# Patient Record
Sex: MALE | Race: BLACK OR AFRICAN AMERICAN | Employment: FULL TIME | URBAN - METROPOLITAN AREA
[De-identification: names, ages, dates, MRNs, and addresses within clinical notes are randomized per-mention and may not be internally consistent; named-entity substitution may affect disease eponyms.]

---

## 2017-08-30 ENCOUNTER — HOSPITAL ENCOUNTER (EMERGENCY)
Age: 32
Discharge: HOME | End: 2017-08-31
Attending: EMERGENCY MEDICINE
Payer: COMMERCIAL

## 2017-08-30 VITALS
WEIGHT: 126 LBS | BODY MASS INDEX: 19.16 KG/M2 | SYSTOLIC BLOOD PRESSURE: 123 MMHG | TEMPERATURE: 98.5 F | RESPIRATION RATE: 18 BRPM | HEART RATE: 79 BPM | OXYGEN SATURATION: 100 % | DIASTOLIC BLOOD PRESSURE: 80 MMHG

## 2017-08-30 DIAGNOSIS — Z20.2 POSSIBLE EXPOSURE TO STD: Primary | ICD-10-CM

## 2017-08-30 PROCEDURE — 99282 EMERGENCY DEPT VISIT SF MDM: CPT

## 2017-08-31 LAB
HAV IGM SER QL: NEGATIVE
HBV CORE IGM SER QL: NEGATIVE
HBV SURFACE AG SER QL: <0.1 INDEX
HBV SURFACE AG SER QL: NEGATIVE
HCV AB SER IA-ACNC: 0.1 INDEX
HCV AB SERPL QL IA: NEGATIVE
HCV COMMENT,HCGAC: NORMAL
RPR SER QL: NONREACTIVE
SP1: NORMAL
SP2: NORMAL
SP3: NORMAL

## 2017-08-31 PROCEDURE — 86592 SYPHILIS TEST NON-TREP QUAL: CPT | Performed by: PHYSICIAN ASSISTANT

## 2017-08-31 PROCEDURE — 86694 HERPES SIMPLEX NES ANTBDY: CPT | Performed by: PHYSICIAN ASSISTANT

## 2017-08-31 PROCEDURE — 80074 ACUTE HEPATITIS PANEL: CPT | Performed by: PHYSICIAN ASSISTANT

## 2017-08-31 NOTE — ED PROVIDER NOTES
HPI Comments: Analilia Nelson is a 33 y.o. male with a pertinent history of anemia, asthma who presents to the emergency department for evaluation of intermittent painful tongue lesions x 8 months. He relates he does not have them currently, but he just wanted to get checked. He admits to recent protected anal intercourse. He states he had GC/CT swab and HIV test performed at [facility] recently and states that both tests were negative. Pt denies any fevers or chills, headache, dizziness or light headedness, ENT issues, CP or discomfort, SOB, cough, n/v/d/c, abd pain, back pain,melena/hematochezia, dysuria, hematuria, frequency, focal weakness/numbness/tingling, genital lesions, anal lesions, or rash. Patient has no other complaints at this time. PCP:  None        Patient is a 33 y.o. male presenting with sexually transmitted disease. Sexually Transmitted Disease   Pertinent negatives include no chest pain, no abdominal pain, no headaches and no shortness of breath. Past Medical History:   Diagnosis Date    Anemia     Asthma     History of blood transfusion        History reviewed. No pertinent surgical history. History reviewed. No pertinent family history. Social History     Social History    Marital status: SINGLE     Spouse name: N/A    Number of children: N/A    Years of education: N/A     Occupational History    Not on file. Social History Main Topics    Smoking status: Never Smoker    Smokeless tobacco: Not on file    Alcohol use Yes      Comment: social    Drug use: No    Sexual activity: Not on file     Other Topics Concern    Not on file     Social History Narrative         ALLERGIES: Review of patient's allergies indicates no known allergies. Review of Systems   Constitutional: Negative for chills and fever. HENT: Positive for mouth sores. Negative for congestion, rhinorrhea and sore throat. Respiratory: Negative for cough and shortness of breath. Cardiovascular: Negative for chest pain. Gastrointestinal: Negative for abdominal pain, blood in stool, constipation, diarrhea, nausea and vomiting. Genitourinary: Negative for discharge, dysuria, frequency, genital sores, hematuria, penile pain and testicular pain. Musculoskeletal: Negative for back pain and myalgias. Skin: Negative for rash and wound. Neurological: Negative for dizziness and headaches. Vitals:    08/30/17 2242   BP: 123/80   Pulse: 79   Resp: 18   Temp: 98.5 °F (36.9 °C)   SpO2: 100%   Weight: 57.2 kg (126 lb)            Physical Exam   Constitutional: He is oriented to person, place, and time. He appears well-developed and well-nourished. No distress. HENT:   Head: Normocephalic and atraumatic. Unremarkable internal oral exam.  No defined lesions. Eyes: Conjunctivae and EOM are normal. Right eye exhibits no discharge. Left eye exhibits no discharge. Neck: Normal range of motion. Neck supple. No thyromegaly present. Cardiovascular: Normal rate, regular rhythm and normal heart sounds. Exam reveals no gallop and no friction rub. No murmur heard. Pulmonary/Chest: Effort normal and breath sounds normal. No respiratory distress. He has no wheezes. He has no rales. He exhibits no tenderness. Abdominal: Soft. Bowel sounds are normal. He exhibits no distension. There is no tenderness. There is no rebound and no guarding. Musculoskeletal: Normal range of motion. He exhibits no edema or deformity. Lymphadenopathy:     He has no cervical adenopathy. Neurological: He is alert and oriented to person, place, and time. Skin: Skin is warm and dry. He is not diaphoretic. Psychiatric: He has a normal mood and affect. Nursing note and vitals reviewed.        MDM  Number of Diagnoses or Management Options  Possible exposure to STD: new and requires workup  Diagnosis management comments: Differential Diagnosis:  Trichomonas, chlamydia, gonorrhea, HSV, HPV, syphilis, yeast, contact dermatitis, molluscum contagiosum, prostatitis, epididymitis, UTI, HIV, Hepatitis    Plan:  Pt presents in NAD, vitals wnl. He has no current symptoms. He just wants to be checked for STDs. RPR, hepatitis, and HSV tests ordered and pending. At this time, patient is stable and appropriate for discharge home. Patient demonstrates understanding of current diagnoses and is in agreement with the treatment plan. They are advised that while the likelihood of serious underlying condition is low at this point given the evaluation performed today, we cannot fully rule it out. They are advised to immediately return with any new symptoms or worsening of current condition. All questions have been answered. Patient is given educational material regarding their diagnoses, including danger symptoms and when to return to the ED. Follow-up with STD clinic. Amount and/or Complexity of Data Reviewed  Clinical lab tests: ordered and reviewed  Review and summarize past medical records: yes    Risk of Complications, Morbidity, and/or Mortality  Presenting problems: moderate  Diagnostic procedures: moderate  Management options: moderate    Patient Progress  Patient progress: improved    ED Course       Procedures      -------------------------------------------------------------------------------------------------------------------  Orders:  Orders Placed This Encounter    HSV 1/2 AB, IGG/IGM     Standing Status:   Standing     Number of Occurrences:   1    RPR     Standing Status:   Standing     Number of Occurrences:   1    HEPATITIS PANEL, ACUTE     Standing Status:   Standing     Number of Occurrences:   1        Lab Results:   No results found for this or any previous visit (from the past 12 hour(s)).     Progress Notes:  12:45 AM:  Nash Swan PA-C was at the pt's bedside, assessed the pt and answered the pt's questions regarding treatment.    -------------------------------------------------------------------------------------------------------------------    Disposition:  Diagnosis:   1. Possible exposure to STD        Disposition: [place] Home    Follow-up Information     [facility] With Details [facility] [address] [facility] As needed for follow-up [address] [place] [address] [address] [facility] EMERGENCY DEPT Go to As needed, If symptoms worsen [address] [address]  [phone]          Patient's Medications   Start Taking    No medications on file   Continue Taking    ALBUTEROL (PROVENTIL, VENTOLIN) 90 MCG/ACTUATION INHALER    Take 1-2 Puffs by inhalation every four (4) hours as needed for Wheezing. ALBUTEROL (VENTOLIN HFA) 90 MCG/ACTUATION INHALER    Take 2 Puffs by inhalation every four (4) hours as needed. GUAIFENESIN-CODEINE (GUAIFENESIN AC)  MG/5 ML SOLUTION    Take 5 mL by mouth three (3) times daily as needed for Cough. MONTELUKAST (SINGULAIR) 10 MG TABLET    Take 10 mg by mouth daily. MONTELUKAST (SINGULAIR) 10 MG TABLET    Take 1 Tab by mouth daily.    These Medications have changed    No medications on file   Stop Taking    No medications on file

## 2017-08-31 NOTE — DISCHARGE INSTRUCTIONS
Learning How to Use a Male Condom  What is a male condom? Condoms can be used to prevent pregnancy. They can also help protect against sexually transmitted infections (STIs). You must use a new condom every time you have sex. Condoms prevent pregnancy by keeping sperm and eggs apart. The condom holds the sperm so the sperm can't get into the vagina. A male condom is a tube of soft rubber or plastic with a closed end. It fits over the penis. There are many kinds of male condoms. Some condoms are lubricated. Some are ribbed. Most have a \"reservoir tip\" for holding the semen. You can also buy condoms of different sizes. How do you use a condom? Condoms work best if you follow these steps. · Use a new condom each time you have sex. · Check the condom's expiration date. Do not use it past that date. · When opening the condom wrapper, be sure not to poke a hole in the condom with your fingernails, teeth, or other sharp objects. · Put the condom on as soon as the penis is hard (erect) and before any sexual contact with your partner. ¨ First, hold the tip of the condom and squeeze out the air. This leaves room for the semen after you ejaculate. ¨ If you are not circumcised, pull down the loose skin from the head of the penis (foreskin) before you put on the condom. ¨ Hold on to the tip of the condom as you unroll the condom. Unroll it all the way down to the base of the penis. · After you ejaculate, hold on to the condom at the base of the penis, and withdraw from your partner while your penis is still erect. This will keep semen from spilling out of the condom. · Wash your hands after you handle a used condom. How do you buy and store condoms? · Male condoms may be available for free at family planning clinics. You can buy them without a prescription at drugstores, online, and in some grocery stores. · Keep condoms wrapped in their original packages until you are ready to use them.  Store them in a cool, dry place out of direct sunlight. · Don't keep rubber (latex) condoms in a glove compartment or other hot places for a long time. Heat weakens latex and increases the chance that the condom will break. · Don't use condoms in damaged packages. And don't use condoms that are brittle, sticky, or discolored, even if they are not past their expiration date. What else do you need to know? · To protect yourself and your partner from STIs, use a condom during vaginal, oral, or anal sex. · If the condom breaks or you think sperm may have leaked out into the vagina, the woman can use emergency contraception, such as the morning-after pill (Plan B). Emergency contraception can be used for up to 5 days after you have had sex. But it works best if you use it right away. · Use a male condom with another form of birth control. It's the best way to prevent pregnancy. ¨ You can use the condom with hormonal contraception, an intrauterine device (IUD), a diaphragm, a sponge, a shield, or a cervical cap. ¨ Don't use a male condom with a female condom. ¨ Use spermicide as its instructions say. Don't put spermicide inside a condom. · If you or your partner gets a rash or feels itchy after using a latex condom, talk to your doctor. You may have a latex allergy. Where can you learn more? Go to http://dana-bee.info/. Enter W308 in the search box to learn more about \"Learning How to Use a Male Condom. \"  Current as of: March 16, 2017  Content Version: 11.3  © 9453-0123 [employer]. Care instructions adapted under license by [employer] (which disclaims liability or warranty for this information). If you have questions about a medical condition or this instruction, always ask your healthcare professional. [address] any warranty or liability for your use of this information.

## 2017-08-31 NOTE — ED TRIAGE NOTES
Pt states he has had unprotected sexual encounters,  Wants to have oral and anal cultures.   States he went today to have HIV test.  Only symptoms are a couple of mouth sores in the back

## 2017-08-31 NOTE — ED NOTES
Received nursing report from [facility]. Assuming care of the patient at this time.

## 2017-09-01 LAB
HSV1 IGG SER IA-ACNC: <0.91 INDEX (ref 0–0.9)
HSV1+2 IGM SER IA-ACNC: 2.56 RATIO (ref 0–0.9)
HSV2 IGG SER IA-ACNC: <0.91 INDEX (ref 0–0.9)

## 2021-01-08 ENCOUNTER — HOSPITAL ENCOUNTER (OUTPATIENT)
Dept: GENERAL RADIOLOGY | Age: 36
Discharge: HOME | End: 2021-01-08
Payer: COMMERCIAL

## 2021-01-08 DIAGNOSIS — J18.9 PNEUMONIA: ICD-10-CM

## 2021-01-08 PROCEDURE — 71046 X-RAY EXAM CHEST 2 VIEWS: CPT
